# Patient Record
Sex: FEMALE | Race: WHITE | Employment: UNEMPLOYED | URBAN - METROPOLITAN AREA
[De-identification: names, ages, dates, MRNs, and addresses within clinical notes are randomized per-mention and may not be internally consistent; named-entity substitution may affect disease eponyms.]

---

## 2017-06-30 ENCOUNTER — OFFICE VISIT (OUTPATIENT)
Dept: OBGYN CLINIC | Age: 57
End: 2017-06-30

## 2017-06-30 ENCOUNTER — APPOINTMENT (OUTPATIENT)
Dept: MAMMOGRAPHY | Age: 57
End: 2017-06-30

## 2017-06-30 VITALS
HEIGHT: 64 IN | HEART RATE: 90 BPM | SYSTOLIC BLOOD PRESSURE: 127 MMHG | DIASTOLIC BLOOD PRESSURE: 82 MMHG | BODY MASS INDEX: 40.97 KG/M2 | WEIGHT: 240 LBS | RESPIRATION RATE: 19 BRPM

## 2017-06-30 DIAGNOSIS — Z12.31 ENCOUNTER FOR SCREENING MAMMOGRAM FOR MALIGNANT NEOPLASM OF BREAST: ICD-10-CM

## 2017-06-30 DIAGNOSIS — Z11.51 SPECIAL SCREENING EXAMINATION FOR HUMAN PAPILLOMAVIRUS (HPV): ICD-10-CM

## 2017-06-30 DIAGNOSIS — Z01.419 WELL FEMALE EXAM WITH ROUTINE GYNECOLOGICAL EXAM: Primary | ICD-10-CM

## 2017-06-30 RX ORDER — ROSUVASTATIN CALCIUM 20 MG/1
TABLET, COATED ORAL
COMMUNITY
Start: 2017-06-25

## 2017-06-30 RX ORDER — BUPROPION HYDROCHLORIDE 300 MG/1
TABLET ORAL
COMMUNITY
Start: 2017-06-12 | End: 2017-11-24

## 2017-06-30 RX ORDER — PANTOPRAZOLE SODIUM 40 MG/1
TABLET, DELAYED RELEASE ORAL
COMMUNITY
Start: 2017-06-08

## 2017-06-30 RX ORDER — LORATADINE 10 MG/1
TABLET ORAL
COMMUNITY
Start: 2017-06-12

## 2017-06-30 NOTE — PROGRESS NOTES
Felix Yasmanyrahul Blanton is a No obstetric history on file. ,  61 y.o. female [facility] whose LMP was on  who presents for her annual checkup. She is having no significant problems. Menstrual status:    She is not having periods because she is menopausal.    The patient is not using HRT. Contraception:    She does not need contraception because she is menopausal.    Sexual history:    She  has no sexual activity history on file. Medical conditions:    Since her last annual GYN exam about one year ago, she has had the following changes in her health history: none. Pap and Mammogram History:    Her most recent Pap smear was normal obtained 2 year(s) ago. The patient had her mammogram today in our office. Breast Cancer History/Substance Abuse:    She has no family history of breast cancer. Osteoporosis History:    Family history does not include a first or second degree relative with osteopenia or osteoporosis. A bone density scan has not been previously obtained. Past Medical History:   Diagnosis Date    Acid reflux     Depression     bi-polar    Diabetes (HCC)     High cholesterol     Hypertension     IBS (irritable bowel syndrome)     Sleep apnea      Past Surgical History:   Procedure Laterality Date    HX  SECTION      c section x2     Current Outpatient Prescriptions   Medication Sig Dispense Refill    rosuvastatin (CRESTOR) 20 mg tablet       loratadine (CLARITIN) 10 mg tablet       buPROPion XL (WELLBUTRIN XL) 300 mg XL tablet       pantoprazole (PROTONIX) 40 mg tablet       lisinopril (PRINIVIL, ZESTRIL) 20 mg tablet Take  by mouth daily.  DULCOLAX, BISACODYL, PO Take  by mouth.  citalopram (CELEXA) 40 mg tablet Take 40 mg by mouth daily.  simvastatin (ZOCOR) 40 mg tablet Take  by mouth nightly.  lamotrigine (LAMICTAL) 200 mg tablet Take  by mouth daily.  quetiapine (SEROQUEL) 50 mg tablet Take 50 mg by mouth two (2) times a day.       divalproex ER (DEPAKOTE ER) 500 mg ER tablet Take  by mouth.  hydrochlorothiazide (HYDRODIURIL) 25 mg tablet Take 25 mg by mouth daily.  fenofibrate (TRICOR) 160 mg tablet Take 160 mg by mouth daily.  aspirin delayed-release 81 mg tablet Take  by mouth daily.  glyBURIDE-metFORMIN (GLUCOVANCE) 5-500 mg per tablet Take 1 tablet by mouth Daily (before breakfast).  dicyclomine (BENTYL) 10 mg capsule Take 10 mg by mouth 4 times daily (before meals and nightly).  omega-3 fatty acids-vitamin e (FISH OIL) 1,000 mg cap Take 1 capsule by mouth.  norethindrone acetate-ethinyl estradiol (FEMHRT) 1-5 mg-mcg tab Take 1 Tab by mouth daily. 30 Tab 12    estrogen, conjugated,-medroxyPROGESTERone (PREMPRO) 0.45-1.5 mg per tablet Take 1 Tab by mouth daily. 28 Tab 12    CINNAMON BARK (CINNAMON PO) Take  by mouth.  multivitamin (ONE A DAY) tablet Take 1 tablet by mouth daily. Allergies: Review of patient's allergies indicates no known allergies. Social History     Social History    Marital status:      Spouse name: N/A    Number of children: N/A    Years of education: N/A     Occupational History    Not on file. Social History Main Topics    Smoking status: Never Smoker    Smokeless tobacco: Never Used    Alcohol use No    Drug use: No    Sexual activity: Not on file     Other Topics Concern    Not on file     Social History Narrative     Tobacco History:  reports that she has never smoked. She has never used smokeless tobacco.  Alcohol Abuse:  reports that she does not drink alcohol. Drug Abuse:  reports that she does not use illicit drugs.   Patient Active Problem List   Diagnosis Code    Costochondritis M94.0         Review of Systems - History obtained from the patient  Constitutional: negative for weight loss, fever, night sweats  HEENT: negative for hearing loss, earache, congestion, snoring, sorethroat  CV: negative for chest pain, palpitations, edema  Resp: negative for cough, shortness of breath, wheezing  GI: negative for change in bowel habits, abdominal pain, black or bloody stools  : negative for frequency, dysuria, hematuria, vaginal discharge  MSK: negative for back pain, joint pain, muscle pain  Breast: negative for breast lumps, nipple discharge, galactorrhea  Skin :negative for itching, rash, hives  Neuro: negative for dizziness, headache, confusion, weakness  Psych: negative for anxiety, depression, change in mood  Heme/lymph: negative for bleeding, bruising, pallor    Physical Exam    Visit Vitals    /82 (BP 1 Location: Left arm, BP Patient Position: Sitting)    Pulse 90    Resp 19    Ht 5' 4\" (1.626 m)    Wt 240 lb (108.9 kg)    BMI 41.2 kg/m2     Constitutional  · Appearance: well-nourished, well developed, alert, in no acute distress    HENT  · Head and Face: appears normal    Neck  · Inspection/Palpation: normal appearance, no masses or tenderness  · Lymph Nodes: no lymphadenopathy present    Chest  · Respiratory Effort: breathing normal    Breasts  · Inspection of Breasts: breasts symmetrical, no skin changes, no discharge present, nipple appearance normal, no skin retraction present  · Palpation of Breasts and Axillae: no masses present on palpation, no breast tenderness  · Axillary Lymph Nodes: no lymphadenopathy present    Gastrointestinal  · Abdominal Examination: abdomen non-tender to palpation, normal bowel sounds, no masses present  · Liver and spleen: no hepatomegaly present, spleen not palpable  · Hernias: no hernias identified    Skin  · General Inspection: no rash, no lesions identified    Neurologic/Psychiatric  · Mental Status:  · Orientation: grossly oriented to person, place and time  · Mood and Affect: mood normal, affect appropriate    Genitourinary  · External Genitalia: normal appearance for age, no discharge present, no tenderness present, no inflammatory lesions present, no masses present, no atrophy present  · Vagina: normal vaginal vault without central or paravaginal defects, no discharge present, no inflammatory lesions present, no masses present  · Bladder: non-tender to palpation  · Urethra: appears normal  · Cervix: normal   · Uterus: normal size, shape and consistency  · Adnexa: no adnexal tenderness present, no adnexal masses present  · Perineum: perineum within normal limits, no evidence of trauma, no rashes or skin lesions present  · Anus: anus within normal limits, no hemorrhoids present  · Inguinal Lymph Nodes: no lymphadenopathy present    Assessment:  Routine gynecologic examination  Her current medical status is satisfactory with no evidence of significant gynecologic issues.     Plan:  Counseled re: diet, exercise, healthy lifestyle  Return for yearly wellness visits  Rec annual mammogram  Patient Verbalized understanding

## 2017-07-04 LAB
CYTOLOGIST CVX/VAG CYTO: NORMAL
CYTOLOGY CVX/VAG DOC THIN PREP: NORMAL
CYTOLOGY HISTORY:: NORMAL
DX ICD CODE: NORMAL
HPV I/H RISK 1 DNA CVX QL PROBE+SIG AMP: NEGATIVE
Lab: NORMAL
OTHER STN SPEC: NORMAL
PATH REPORT.FINAL DX SPEC: NORMAL
STAT OF ADQ CVX/VAG CYTO-IMP: NORMAL

## 2017-09-15 ENCOUNTER — TELEPHONE (OUTPATIENT)
Dept: OBGYN CLINIC | Age: 57
End: 2017-09-15

## 2017-09-15 ENCOUNTER — OFFICE VISIT (OUTPATIENT)
Dept: OBGYN CLINIC | Age: 57
End: 2017-09-15

## 2017-09-15 DIAGNOSIS — N89.8 VAGINAL IRRITATION: Primary | ICD-10-CM

## 2017-09-15 NOTE — PROGRESS NOTES
Vaginitis evaluation    Chief Complaint   Discomfort and irritation in the vagina    HPI  61 y.o. female complains of vaginal irritation for several days. No LMP recorded. Patient is postmenopausal.  She reports additional symptoms at this time. Feels like something is in vagina. The patient denies aggravating factors. She is not concerned about possible STI exposure at this time. She denies exposure to new chemicals ot hygenic agents  Previous treatment included: none    Past Medical History:   Diagnosis Date    Acid reflux     Depression     bi-polar    Diabetes ([address]     High cholesterol     Hypertension     IBS (irritable bowel syndrome)     Sleep apnea      Past Surgical History:   Procedure Laterality Date    HX  SECTION      c section x2     Social History     Occupational History    Not on file. Social History Main Topics    Smoking status: Never Smoker    Smokeless tobacco: Never Used    Alcohol use No    Drug use: No    Sexual activity: Not on file     Family History   Problem Relation Age of Onset    Psychiatric Disorder Mother     Cancer Mother      brain    Cancer Father      prostate    Heart Disease Father         No Known Allergies  Prior to Admission medications    Medication Sig Start Date End Date Taking? Authorizing Provider   rosuvastatin (CRESTOR) 20 mg tablet  17  Yes Historical Provider   loratadine (CLARITIN) 10 mg tablet  17  Yes Historical Provider   buPROPion XL (WELLBUTRIN XL) 300 mg XL tablet  17  Yes Historical Provider   pantoprazole (PROTONIX) 40 mg tablet  17  Yes Historical Provider   lisinopril (PRINIVIL, ZESTRIL) 20 mg tablet Take  by mouth daily. Yes Historical Provider   DULCOLAX, BISACODYL, PO Take  by mouth. Yes Historical Provider   citalopram (CELEXA) 40 mg tablet Take 40 mg by mouth daily. Yes Historical Provider   lamotrigine (LAMICTAL) 200 mg tablet Take  by mouth daily.    Yes Historical Provider   divalproex ER (DEPAKOTE ER) 500 mg ER tablet Take  by mouth. Yes Historical Provider   hydrochlorothiazide (HYDRODIURIL) 25 mg tablet Take 25 mg by mouth daily. Yes Historical Provider   fenofibrate (TRICOR) 160 mg tablet Take 160 mg by mouth daily. Yes Historical Provider   glyBURIDE-metFORMIN (GLUCOVANCE) 5-500 mg per tablet Take 1 tablet by mouth Daily (before breakfast). Yes Historical Provider   dicyclomine (BENTYL) 10 mg capsule Take 10 mg by mouth 4 times daily (before meals and nightly). Yes Historical Provider   simvastatin (ZOCOR) 40 mg tablet Take  by mouth nightly. Historical Provider   quetiapine (SEROQUEL) 50 mg tablet Take 50 mg by mouth two (2) times a day. Historical Provider   aspirin delayed-release 81 mg tablet Take  by mouth daily. Historical Provider   omega-3 fatty acids-vitamin e (FISH OIL) 1,000 mg cap Take 1 capsule by mouth. Historical Provider                      Review of Systems - History obtained from the patient  Constitutional: negative for weight loss, fever, night sweats  Breast: negative for breast lumps, nipple discharge, galactorrhea  GI: negative for change in bowel habits, abdominal pain, black or bloody stools  : negative for frequency, dysuria, hematuria  MSK: negative for back pain, joint pain, muscle pain  Skin: negative for itching, rash, hives  Neuro: negative for dizziness, headache, confusion, weakness  Psych: negative for anxiety, depression, change in mood  Heme/lymph: negative for bleeding, bruising, pallor       Objective: There were no vitals taken for this visit.     Physical Exam:   PHYSICAL EXAMINATION    Constitutional  · Appearance: well-nourished, well developed, alert, in no acute distress    HENT  · Head and Face: appears normal    Genitourinary  · External Genitalia: normal appearance for age, no discharge present, no tenderness present, no inflammatory lesions present, no masses present, no atrophy present  · Vagina:  no discharge present, otherwise normal vaginal vault without central or paravaginal defects, no inflammatory lesions present, no masses present, atrophic changes present  · Bladder: non-tender to palpation  · Urethra: appears normal  · Cervix: normal   · Uterus: normal size, shape and consistency  · Adnexa: no adnexal tenderness present, no adnexal masses present  · Perineum: perineum within normal limits, no evidence of trauma, no rashes or skin lesions present  · Anus: anus within normal limits, no hemorrhoids present  · Inguinal Lymph Nodes: no lymphadenopathy present    Skin  · General Inspection: no rash, no lesions identified    Neurologic/Psychiatric  · Mental Status:  · Orientation: grossly oriented to person, place and time  · Mood and Affect: mood normal, affect appropriate          Assessment:   Symptoms ? From atrophic vaginitis    Plan:   Treatment: Try vaginal moisturizers. If symptoms persist then RTO for an US. ROV prn if symptoms persist or worsen.

## 2017-09-15 NOTE — TELEPHONE ENCOUNTER
61year old patient calling c/o feeling like something is stuck inside of her vagina. Patient denies vaginal discharge, bleeding, itching and irritation. Per patient she is very uncomfortable. Patient requesting to be seen today if possible.  Patient added to schedule to see [facility] [address] today at 3:40pm.

## 2017-11-24 ENCOUNTER — OFFICE VISIT (OUTPATIENT)
Dept: OBGYN CLINIC | Age: 57
End: 2017-11-24

## 2017-11-24 ENCOUNTER — TELEPHONE (OUTPATIENT)
Dept: OBGYN CLINIC | Age: 57
End: 2017-11-24

## 2017-11-24 VITALS — BODY MASS INDEX: 40.97 KG/M2 | RESPIRATION RATE: 19 BRPM | HEIGHT: 64 IN | WEIGHT: 240 LBS

## 2017-11-24 DIAGNOSIS — N63.10 BREAST MASS, RIGHT: Primary | ICD-10-CM

## 2017-11-24 RX ORDER — LANOLIN ALCOHOL/MO/W.PET/CERES
CREAM (GRAM) TOPICAL
COMMUNITY
Start: 2017-11-01

## 2017-11-24 RX ORDER — SIMVASTATIN 40 MG/1
TABLET, FILM COATED ORAL
COMMUNITY
End: 2017-11-27

## 2017-11-24 RX ORDER — GUAIFENESIN 100 MG/5ML
81 LIQUID (ML) ORAL DAILY
COMMUNITY

## 2017-11-24 NOTE — TELEPHONE ENCOUNTER
Patient is calling with concerns of right breast nipple pain. Started recently with sensitivity to the touch, hot to the touch, no discharge from nipple. Had a mammogram 3 months ago per patient that was normal.  Patient states that visibly the nipple looks different to her and looks slightly inverted. Please advise, do you want to see this patient today (concerned about mastitis/cellulitis with being hot to the touch? Or schedule for your next available?

## 2017-11-24 NOTE — PROGRESS NOTES
appearance normal, no skin retraction present; 1 cm tender mass under right nipple  · Palpation of Breasts and Axillae: no masses present on palpation except under right nipple, right  Breast nipple tenderness  · Axillary Lymph Nodes: no lymphadenopathy present    Skin  · General Inspection: no rash, no lesions identified    Neurologic/Psychiatric  · Mental Status:  · Orientation: grossly oriented to person, place and time  · Mood and Affect: mood normal, affect appropriate    Assessment:  Nipple mass, no evidence of mastitis    Plan:  Refer to [facility]

## 2017-11-27 ENCOUNTER — OFFICE VISIT (OUTPATIENT)
Dept: SURGERY | Age: 57
End: 2017-11-27

## 2017-11-27 VITALS
BODY MASS INDEX: 41.06 KG/M2 | WEIGHT: 240.5 LBS | HEIGHT: 64 IN | DIASTOLIC BLOOD PRESSURE: 78 MMHG | SYSTOLIC BLOOD PRESSURE: 140 MMHG | HEART RATE: 98 BPM

## 2017-11-27 DIAGNOSIS — N63.10 BREAST MASS, RIGHT: Primary | ICD-10-CM

## 2017-11-27 PROBLEM — E66.01 OBESITY, MORBID (HCC): Status: ACTIVE | Noted: 2017-11-27

## 2017-11-27 RX ORDER — FENOFIBRATE 160 MG/1
TABLET ORAL
COMMUNITY
Start: 2017-11-24

## 2017-11-27 RX ORDER — BUPROPION HYDROCHLORIDE 300 MG/1
TABLET ORAL
COMMUNITY
Start: 2017-11-21

## 2017-11-27 RX ORDER — HYDROCHLOROTHIAZIDE 25 MG/1
TABLET ORAL
COMMUNITY
Start: 2017-11-07

## 2017-11-27 NOTE — PROGRESS NOTES
HISTORY OF PRESENT ILLNESS  Vee Blanton is a 61 y.o. female. HPI NEW patient referral for consultation by Dr. Shelley Tran for a palpable lump under her RIGHT nipple. The patient states she found it 5 days ago and it is tender to palpation. The pain level is a #6 on pain scale. The RIGHT nipple is inverted but denies any nipple discharge. The nipple is not reddened and is not warm to touch. Obstetric History     No data available      Obstetric Comments   Menarche: 14. LMP: age 35. # of Children: 2. Age at Delivery of First Child: 23.   Hysterectomy/oopho no/no. Breast Bx:  Yes LEFT 2008. Hx of Breast Feeding:  yes. BCP: yes. Hormone therapy: yes very short period of time. .       No family history of breast or ovarian cancer. Mother brain cancer at age 61. Father - prostate cancer at age 58. Mammogram screening 6/2017 - BI RADS 1  Review of Systems   Constitutional: Negative. HENT: Negative. Eyes: Negative. Respiratory: Negative. Cardiovascular: Negative. Gastrointestinal: Negative. Genitourinary: Negative. Musculoskeletal: Negative. Skin: Negative. Neurological: Negative. Endo/Heme/Allergies: Negative. Psychiatric/Behavioral: Positive for depression. The patient is nervous/anxious and has insomnia.         Physical Exam    ASSESSMENT and PLAN  {ASSESSMENT/PLAN:23907}

## 2017-11-27 NOTE — PROGRESS NOTES
HISTORY OF PRESENT ILLNESS  Vee Blanton is a 61 y.o. female. HPI  NEW patient referral for consultation by Dr. Tavares Castellon for a palpable lump under her RIGHT nipple. The patient states she found it 5 days ago and it is tender to palpation. The pain level is a #6 on pain scale. The RIGHT nipple is inverted but denies any nipple discharge. The nipple is not reddened and is not warm to touch. Obstetric History     No data available       Obstetric Comments   Menarche: 14. LMP: age 35. # of Children: 2. Age at Delivery of First Child: 23.   Hysterectomy/oopho no/no. Breast Bx:  Yes LEFT . Hx of Breast Feeding:  yes. BCP: yes. Hormone therapy: yes very short period of time. .       No family history of breast or ovarian cancer. Mother brain cancer at age 61. Father - prostate cancer at age 58. Mammogram screening 2017 - BI RADS 1    Past Medical History:   Diagnosis Date    Acid reflux     Depression     bi-polar    Diabetes (HCC)     High cholesterol     Hypertension     IBS (irritable bowel syndrome)     Sleep apnea        Past Surgical History:   Procedure Laterality Date    HX  SECTION      c section x2       Social History     Social History    Marital status:      Spouse name: N/A    Number of children: N/A    Years of education: N/A     Occupational History    Not on file. Social History Main Topics    Smoking status: Never Smoker    Smokeless tobacco: Never Used    Alcohol use No    Drug use: No    Sexual activity: Not on file     Other Topics Concern    Not on file     Social History Narrative       Current Outpatient Prescriptions on File Prior to Visit   Medication Sig Dispense Refill    ferrous sulfate 325 mg (65 mg iron) tablet       DOC-Q-LACE 100 mg capsule       aspirin 81 mg chewable tablet Take 81 mg by mouth daily.       rosuvastatin (CRESTOR) 20 mg tablet       loratadine (CLARITIN) 10 mg tablet       pantoprazole (PROTONIX) 40 mg tablet  lisinopril (PRINIVIL, ZESTRIL) 20 mg tablet Take  by mouth daily.  DULCOLAX, BISACODYL, PO Take  by mouth.  citalopram (CELEXA) 40 mg tablet Take 40 mg by mouth daily.  lamotrigine (LAMICTAL) 200 mg tablet Take  by mouth daily.  divalproex ER (DEPAKOTE ER) 500 mg ER tablet Take  by mouth.  glyBURIDE-metFORMIN (GLUCOVANCE) 5-500 mg per tablet Take 1 tablet by mouth Daily (before breakfast).  dicyclomine (BENTYL) 10 mg capsule Take 10 mg by mouth 4 times daily (before meals and nightly). No current facility-administered medications on file prior to visit. No Known Allergies    OB History     Obstetric Comments    Menarche: 14. LMP: age 35. # of Children: 2. Age at Delivery of First Child: 23.   Hysterectomy/oopho no/no. Breast Bx:  Yes LEFT 2008. Hx of Breast Feeding:  yes. BCP: yes. Hormone therapy: yes very short period of time. .           ROS  Constitutional: Negative. HENT: Negative. Eyes: Negative. Respiratory: Negative. Cardiovascular: Negative. Gastrointestinal: Negative. Genitourinary: Negative. Musculoskeletal: Negative. Skin: Negative. Neurological: Negative. Endo/Heme/Allergies: Negative. Psychiatric/Behavioral: Positive for depression. The patient is nervous/anxious and has insomnia. Physical Exam   Cardiovascular: Normal rate and normal heart sounds. Pulmonary/Chest: Breath sounds normal. Right breast exhibits mass and tenderness. Right breast exhibits no inverted nipple, no nipple discharge and no skin change. Left breast exhibits no inverted nipple, no mass, no nipple discharge, no skin change and no tenderness. Breasts are symmetrical.       Lymphadenopathy:        Right cervical: No superficial cervical, no deep cervical and no posterior cervical adenopathy present. Left cervical: No superficial cervical, no deep cervical and no posterior cervical adenopathy present.         Right axillary: No pectoral and no lateral adenopathy present. Left axillary: No pectoral and no lateral adenopathy present. BREAST ULTRASOUND  Indication: Right  breast mass retroareola  Technique: The area was scanned using a high-frequency linear-array near-field transducer  Findings: No abnormal mass, lesion, or shadowing noted. No cysts  Impression: Normal breast tissue   Disposition: No worrisome finding on ultrasound  ASSESSMENT and PLAN    ICD-10-CM ICD-9-CM    1. Breast mass, right N63.10 611.72      Pt presents with RIGHT retroareolar mass. Normal exam and US today. Pt states she had [facility] screening mammo in October, which was also normal. Advised to observe and f/u in 3-4 months for reexamination if any change. This plan was reviewed with the patient and patient agrees. All questions were answered.     Written by John Paul Bradley, as dictated by Dr. Jude Heller MD.

## 2017-11-27 NOTE — PATIENT INSTRUCTIONS
Breast Lumps: Care Instructions  Your Care Instructions  Breast lumps are common, especially in women between ages 27 and 48. Many women's breasts feel lumpy and tender before their menstrual period. Women also may have lumps when they are breastfeeding. Breast lumps may go away after menopause. All new breast lumps in women after menopause should be checked by a doctor. Although lumps may be normal for you, it is important to have your doctor check any lump or thickness that is not like the rest of your breast to make sure it is not cancer. A lump may be larger, harder, or different from the rest of your breast tissue. Follow-up care is a key part of your treatment and safety. Be sure to make and go to all appointments, and call your doctor if you are having problems. It's also a good idea to know your test results and keep a list of the medicines you take. How can you care for yourself at home? · Make an appointment to have a mammogram and other follow-up visits as recommended by your doctor. When should you call for help? Watch closely for changes in your health, and be sure to contact your doctor if:  · You do not get better as expected. · Your breast has changed. · You have pain in your breast.  · You have a discharge from your nipple. · A breast lump changes or does not go away. Where can you learn more? Go to http://mitch-guillermina.info/. Enter R940 in the search box to learn more about \"Breast Lumps: Care Instructions. \"  Current as of: October 13, 2016  Content Version: 11.4  © 6880-6759 [employer]. Care instructions adapted under license by [employer] (which disclaims liability or warranty for this information). If you have questions about a medical condition or this instruction, always ask your healthcare professional. [address] any warranty or liability for your use of this information.

## 2017-11-27 NOTE — LETTER
2017 4:35 PM 
 
Patient:  Richard Tay YOB: 1960 Date of Visit: 2017 Dear Dr. Ashley Wong: Thank you for referring Ms. Vee Blanton to me for evaluation/treatment. Below are the relevant portions of my assessment and plan of care. HISTORY OF PRESENT ILLNESS Richard Tay is a 61 y.o. female. HPI 
NEW patient referral for consultation by Dr. Ashley Wong for a palpable lump under her RIGHT nipple. The patient states she found it 5 days ago and it is tender to palpation. The pain level is a #6 on pain scale. The RIGHT nipple is inverted but denies any nipple discharge. The nipple is not reddened and is not warm to touch. Obstetric History No data available  
   
Obstetric Comments Menarche: 14. LMP: age 35. # of Children: 2. Age at Delivery of First Child: 23.   Hysterectomy/oopho no/no. Breast Bx:  Yes LEFT . Hx of Breast Feeding:  yes. BCP: yes. Hormone therapy: yes very short period of time. Mickey Stone No family history of breast or ovarian cancer. Mother brain cancer at age 61. Father - prostate cancer at age 58. Mammogram screening 2017 - BI RADS 1 Past Medical History:  
Diagnosis Date  Acid reflux  Depression   
 bi-polar  Diabetes ([address]  High cholesterol  Hypertension  IBS (irritable bowel syndrome)  Sleep apnea Past Surgical History:  
Procedure Laterality Date  HX  SECTION    
 c section x2 Social History Social History  Marital status:  Spouse name: N/A  
 Number of children: N/A  
 Years of education: N/A Occupational History  Not on file. Social History Main Topics  Smoking status: Never Smoker  Smokeless tobacco: Never Used  Alcohol use No  
 Drug use: No  
 Sexual activity: Not on file Other Topics Concern  Not on file Social History Narrative Current Outpatient Prescriptions on File Prior to Visit Medication Sig Dispense Refill  ferrous sulfate 325 mg (65 mg iron) tablet  DOC-Q-LACE 100 mg capsule  aspirin 81 mg chewable tablet Take 81 mg by mouth daily.  rosuvastatin (CRESTOR) 20 mg tablet  loratadine (CLARITIN) 10 mg tablet  pantoprazole (PROTONIX) 40 mg tablet  lisinopril (PRINIVIL, ZESTRIL) 20 mg tablet Take  by mouth daily.  DULCOLAX, BISACODYL, PO Take  by mouth.  citalopram (CELEXA) 40 mg tablet Take 40 mg by mouth daily.  lamotrigine (LAMICTAL) 200 mg tablet Take  by mouth daily.  divalproex ER (DEPAKOTE ER) 500 mg ER tablet Take  by mouth.  glyBURIDE-metFORMIN (GLUCOVANCE) 5-500 mg per tablet Take 1 tablet by mouth Daily (before breakfast).  dicyclomine (BENTYL) 10 mg capsule Take 10 mg by mouth 4 times daily (before meals and nightly). No current facility-administered medications on file prior to visit. No Known Allergies OB History Obstetric Comments Menarche: 14. LMP: age 35. # of Children: 2. Age at Delivery of First Child: 23.   Hysterectomy/oopho no/no. Breast Bx:  Yes LEFT 2008. Hx of Breast Feeding:  yes. BCP: yes. Hormone therapy: yes very short period of time. Tomy LUO Constitutional: Negative. HENT: Negative. Eyes: Negative. Respiratory: Negative. Cardiovascular: Negative. Gastrointestinal: Negative. Genitourinary: Negative. Musculoskeletal: Negative. Skin: Negative. Neurological: Negative. Endo/Heme/Allergies: Negative. Psychiatric/Behavioral: Positive for depression. The patient is nervous/anxious and has insomnia. Physical Exam  
Cardiovascular: Normal rate and normal heart sounds. Pulmonary/Chest: Breath sounds normal. Right breast exhibits mass and tenderness. Right breast exhibits no inverted nipple, no nipple discharge and no skin change. Left breast exhibits no inverted nipple, no mass, no nipple discharge, no skin change and no tenderness.  Breasts are symmetrical.  
 
 
 Lymphadenopathy:  
     Right cervical: No superficial cervical, no deep cervical and no posterior cervical adenopathy present. Left cervical: No superficial cervical, no deep cervical and no posterior cervical adenopathy present. Right axillary: No pectoral and no lateral adenopathy present. Left axillary: No pectoral and no lateral adenopathy present. BREAST ULTRASOUND Indication: Right  breast mass retroareola Technique: The area was scanned using a high-frequency linear-array near-field transducer Findings: No abnormal mass, lesion, or shadowing noted. No cysts Impression: Normal breast tissue Disposition: No worrisome finding on ultrasound ASSESSMENT and PLAN 
  ICD-10-CM ICD-9-CM 1. Breast mass, right N63.10 611.72 Pt presents with RIGHT retroareolar mass. Normal exam and US today. Pt states she had BL screening mammo in October, which was also normal. Advised to observe and f/u in 3-4 months for reexamination if any change. This plan was reviewed with the patient and patient agrees. All questions were answered. Written by [facility], as dictated by Dr. Hossein Moon MD.  
 
 
 
If you have questions, please do not hesitate to call me. I look forward to following Ms. Blanton along with you.  
 
 
 
Sincerely, 
 
 
Anabela Torres MD

## 2017-11-27 NOTE — COMMUNICATION BODY
HISTORY OF PRESENT ILLNESS  Vee Blanton is a 61 y.o. female. HPI  NEW patient referral for consultation by Dr. Cierra Hanna for a palpable lump under her RIGHT nipple. The patient states she found it 5 days ago and it is tender to palpation. The pain level is a #6 on pain scale. The RIGHT nipple is inverted but denies any nipple discharge. The nipple is not reddened and is not warm to touch. Obstetric History     No data available       Obstetric Comments   Menarche: 14. LMP: age 35. # of Children: 2. Age at Delivery of First Child: 23.   Hysterectomy/oopho no/no. Breast Bx:  Yes LEFT . Hx of Breast Feeding:  yes. BCP: yes. Hormone therapy: yes very short period of time. .       No family history of breast or ovarian cancer. Mother brain cancer at age 61. Father - prostate cancer at age 58. Mammogram screening 2017 - BI RADS 1    Past Medical History:   Diagnosis Date    Acid reflux     Depression     bi-polar    Diabetes (HCC)     High cholesterol     Hypertension     IBS (irritable bowel syndrome)     Sleep apnea        Past Surgical History:   Procedure Laterality Date    HX  SECTION      c section x2       Social History     Social History    Marital status:      Spouse name: N/A    Number of children: N/A    Years of education: N/A     Occupational History    Not on file. Social History Main Topics    Smoking status: Never Smoker    Smokeless tobacco: Never Used    Alcohol use No    Drug use: No    Sexual activity: Not on file     Other Topics Concern    Not on file     Social History Narrative       Current Outpatient Prescriptions on File Prior to Visit   Medication Sig Dispense Refill    ferrous sulfate 325 mg (65 mg iron) tablet       DOC-Q-LACE 100 mg capsule       aspirin 81 mg chewable tablet Take 81 mg by mouth daily.       rosuvastatin (CRESTOR) 20 mg tablet       loratadine (CLARITIN) 10 mg tablet       pantoprazole (PROTONIX) 40 mg tablet  lisinopril (PRINIVIL, ZESTRIL) 20 mg tablet Take  by mouth daily.  DULCOLAX, BISACODYL, PO Take  by mouth.  citalopram (CELEXA) 40 mg tablet Take 40 mg by mouth daily.  lamotrigine (LAMICTAL) 200 mg tablet Take  by mouth daily.  divalproex ER (DEPAKOTE ER) 500 mg ER tablet Take  by mouth.  glyBURIDE-metFORMIN (GLUCOVANCE) 5-500 mg per tablet Take 1 tablet by mouth Daily (before breakfast).  dicyclomine (BENTYL) 10 mg capsule Take 10 mg by mouth 4 times daily (before meals and nightly). No current facility-administered medications on file prior to visit. No Known Allergies    OB History     Obstetric Comments    Menarche: 14. LMP: age 35. # of Children: 2. Age at Delivery of First Child: 23.   Hysterectomy/oopho no/no. Breast Bx:  Yes LEFT 2008. Hx of Breast Feeding:  yes. BCP: yes. Hormone therapy: yes very short period of time. .           ROS  Constitutional: Negative. HENT: Negative. Eyes: Negative. Respiratory: Negative. Cardiovascular: Negative. Gastrointestinal: Negative. Genitourinary: Negative. Musculoskeletal: Negative. Skin: Negative. Neurological: Negative. Endo/Heme/Allergies: Negative. Psychiatric/Behavioral: Positive for depression. The patient is nervous/anxious and has insomnia. Physical Exam   Cardiovascular: Normal rate and normal heart sounds. Pulmonary/Chest: Breath sounds normal. Right breast exhibits mass and tenderness. Right breast exhibits no inverted nipple, no nipple discharge and no skin change. Left breast exhibits no inverted nipple, no mass, no nipple discharge, no skin change and no tenderness. Breasts are symmetrical.       Lymphadenopathy:        Right cervical: No superficial cervical, no deep cervical and no posterior cervical adenopathy present. Left cervical: No superficial cervical, no deep cervical and no posterior cervical adenopathy present.         Right axillary: No pectoral and no lateral adenopathy present. Left axillary: No pectoral and no lateral adenopathy present. BREAST ULTRASOUND  Indication: Right  breast mass retroareola  Technique: The area was scanned using a high-frequency linear-array near-field transducer  Findings: No abnormal mass, lesion, or shadowing noted. No cysts  Impression: Normal breast tissue   Disposition: No worrisome finding on ultrasound  ASSESSMENT and PLAN    ICD-10-CM ICD-9-CM    1. Breast mass, right N63.10 611.72      Pt presents with RIGHT retroareolar mass. Normal exam and US today. Pt states she had BL screening mammo in October, which was also normal. Advised to observe and f/u in 3-4 months for reexamination if any change. This plan was reviewed with the patient and patient agrees. All questions were answered.     Written by Michele Vaughn, as dictated by Dr. Gagan Simpson MD.

## 2018-02-09 ENCOUNTER — OFFICE VISIT (OUTPATIENT)
Dept: OBGYN CLINIC | Age: 58
End: 2018-02-09

## 2018-02-09 VITALS — WEIGHT: 232 LBS | BODY MASS INDEX: 39.61 KG/M2 | HEIGHT: 64 IN | RESPIRATION RATE: 19 BRPM

## 2018-02-09 DIAGNOSIS — N63.20 LEFT BREAST MASS: Primary | ICD-10-CM

## 2018-02-09 NOTE — PROGRESS NOTES
Breast Lump Evaluation    Vee Blanton is a No obstetric history on file. ,  61 y.o. female  whose No LMP recorded. Patient is postmenopausal..    She presents with breast mass located in the left breast at 10 o'clock. She noticed it 1 day ago. The lump is not tender and has not changed in size. The patient has not noticed changes in the area of the lump: No dimpling, nipple retraction or discharge. No masses or nodes. .    The patient notes the following associated symptoms: none  She notes that the following factors improve her symptoms: none                                                                                                                             She notes that the following factors aggravate her symptoms:none                                                                                                                               She has had any diagnostic studies for this problem since she noticed it. In regards to breast problems her medical history is significant for the following: none                                                                                                There is not a family history of breast cancer in a first and second degree relative.     Objective:    Visit Vitals    Resp 19    Ht 5' 4\" (1.626 m)    Wt 240 lb (108.9 kg)    BMI 41.2 kg/m2       Physical Exam:    Constitutional  · Appearance: well-nourished, well developed, alert, in no acute distress    HENT  · Head and Face: appears normal    Neck  · Inspection/Palpation: normal appearance, no masses or tenderness  · Lymph Nodes: no lymphadenopathy present  · Thyroid: gland size normal, nontender, no nodules or masses present on palpation    Breasts  · Inspection of Breasts: breasts symmetrical, no skin changes, no discharge present, nipple appearance normal, no skin retraction present  · Palpation of Breasts and Axillae: 1-2 cm firm slightly tender mass on right peripheral breast at 11 o'clock  · Axillary Lymph Nodes: no lymphadenopathy present    Skin  · General Inspection: no rash, no lesions identified    Neurologic/Psychiatric  · Mental Status:  · Orientation: grossly oriented to person, place and time  · Mood and Affect: mood normal, affect appropriate    Assessment:  Left breast mass, ?  Lymph node    Plan:  She will see [address] [place] [address] if mass dose not resolve in next few weeks

## 2018-02-09 NOTE — PATIENT INSTRUCTIONS
Breast Lumps: Care Instructions  Your Care Instructions  Breast lumps are common, especially in women between ages 27 and 48. Many women's breasts feel lumpy and tender before their menstrual period. Women also may have lumps when they are breastfeeding. Breast lumps may go away after menopause. All new breast lumps in women after menopause should be checked by a doctor. Although lumps may be normal for you, it is important to have your doctor check any lump or thickness that is not like the rest of your breast to make sure it is not cancer. A lump may be larger, harder, or different from the rest of your breast tissue. Follow-up care is a key part of your treatment and safety. Be sure to make and go to all appointments, and call your doctor if you are having problems. It's also a good idea to know your test results and keep a list of the medicines you take. How can you care for yourself at home? · Make an appointment to have a mammogram and other follow-up visits as recommended by your doctor. When should you call for help? Watch closely for changes in your health, and be sure to contact your doctor if:  · You do not get better as expected. · Your breast has changed. · You have pain in your breast.  · You have a discharge from your nipple. · A breast lump changes or does not go away. Where can you learn more? Go to http://mitch-guillermina.info/. Enter O531 in the search box to learn more about \"Breast Lumps: Care Instructions. \"  Current as of: October 13, 2016  Content Version: 11.4  © 5260-0774 [employer]. Care instructions adapted under license by [employer] (which disclaims liability or warranty for this information). If you have questions about a medical condition or this instruction, always ask your healthcare professional. [address] any warranty or liability for your use of this information.

## 2018-08-16 ENCOUNTER — OFFICE VISIT (OUTPATIENT)
Dept: OBGYN CLINIC | Age: 58
End: 2018-08-16

## 2018-08-16 VITALS
SYSTOLIC BLOOD PRESSURE: 110 MMHG | WEIGHT: 232.25 LBS | DIASTOLIC BLOOD PRESSURE: 78 MMHG | BODY MASS INDEX: 39.65 KG/M2 | HEIGHT: 64 IN

## 2018-08-16 DIAGNOSIS — Z01.419 WELL WOMAN EXAM WITH ROUTINE GYNECOLOGICAL EXAM: Primary | ICD-10-CM

## 2018-08-16 RX ORDER — NAPROXEN 500 MG/1
TABLET ORAL
COMMUNITY
Start: 2018-07-11 | End: 2020-10-13

## 2018-08-16 NOTE — PROGRESS NOTES
[de-identified] M Day is a No obstetric history on file. ,  61 y.o. female [facility] whose LMP was on  who presents for her annual checkup. She is having no significant problems. Menstrual status:    She is not having periods because she is menopausal.    The patient is not using HRT. Contraception:    She does not need contraception because she is menopausal.    Sexual history:    She  has no sexual activity history on file. Medical conditions:    Since her last annual GYN exam about one year ago, she has had the following changes in her health history: none. Pap and Mammogram History:    Her most recent Pap smear was normal obtained 1 year(s) ago. The patient had her mammogram today in our office. Breast Cancer History/Substance Abuse:    She has no and a family history of breast cancer. Osteoporosis History:    Family history does not include a first or second degree relative with osteopenia or osteoporosis. A bone density scan has not been previously obtained. Past Medical History:   Diagnosis Date    Acid reflux     Depression     bi-polar    Diabetes ([address]     High cholesterol     Hypertension     IBS (irritable bowel syndrome)     Sleep apnea      Past Surgical History:   Procedure Laterality Date    HX  SECTION      c section x2     Current Outpatient Prescriptions   Medication Sig Dispense Refill    naproxen (NAPROSYN) 500 mg tablet       buPROPion XL (WELLBUTRIN XL) 300 mg XL tablet       fenofibrate (LOFIBRA) 160 mg tablet       hydroCHLOROthiazide (HYDRODIURIL) 25 mg tablet       ferrous sulfate 325 mg (65 mg iron) tablet       DOC-Q-LACE 100 mg capsule       aspirin 81 mg chewable tablet Take 81 mg by mouth daily.  rosuvastatin (CRESTOR) 20 mg tablet       loratadine (CLARITIN) 10 mg tablet       pantoprazole (PROTONIX) 40 mg tablet       lisinopril (PRINIVIL, ZESTRIL) 20 mg tablet Take  by mouth daily.       DULCOLAX, BISACODYL, PO Take by mouth.  citalopram (CELEXA) 40 mg tablet Take 40 mg by mouth daily.  lamotrigine (LAMICTAL) 200 mg tablet Take  by mouth daily.  divalproex ER (DEPAKOTE ER) 500 mg ER tablet Take  by mouth.  glyBURIDE-metFORMIN (GLUCOVANCE) 5-500 mg per tablet Take 1 tablet by mouth Daily (before breakfast).  dicyclomine (BENTYL) 10 mg capsule Take 10 mg by mouth 4 times daily (before meals and nightly). Allergies: Review of patient's allergies indicates no known allergies. Social History     Social History    Marital status:      Spouse name: N/A    Number of children: N/A    Years of education: N/A     Occupational History    Not on file. Social History Main Topics    Smoking status: Never Smoker    Smokeless tobacco: Never Used    Alcohol use No    Drug use: No    Sexual activity: Not on file     Other Topics Concern    Not on file     Social History Narrative     Tobacco History:  reports that she has never smoked. She has never used smokeless tobacco.  Alcohol Abuse:  reports that she does not drink alcohol. Drug Abuse:  reports that she does not use illicit drugs.   Patient Active Problem List   Diagnosis Code    Costochondritis M94.0    Obesity, morbid ([facility] E66.01         Review of Systems - History obtained from the patient  Constitutional: negative for weight loss, fever, night sweats  HEENT: negative for hearing loss, earache, congestion, snoring, sorethroat  CV: negative for chest pain, palpitations, edema  Resp: negative for cough, shortness of breath, wheezing  GI: negative for change in bowel habits, abdominal pain, black or bloody stools  : negative for frequency, dysuria, hematuria, vaginal discharge  MSK: negative for back pain, joint pain, muscle pain  Breast: negative for breast lumps, nipple discharge, galactorrhea  Skin :negative for itching, rash, hives  Neuro: negative for dizziness, headache, confusion, weakness  Psych: negative for anxiety, depression, change in mood  Heme/lymph: negative for bleeding, bruising, pallor    Physical Exam    Visit Vitals    /78    Ht 5' 4\" (1.626 m)    Wt 232 lb 4 oz (105.3 kg)    BMI 39.87 kg/m2     Constitutional  · Appearance: well-nourished, well developed, alert, in no acute distress    HENT  · Head and Face: appears normal    Neck  · Inspection/Palpation: normal appearance, no masses or tenderness  · Lymph Nodes: no lymphadenopathy present    Chest  · Respiratory Effort: breathing normal    Breasts  · Inspection of Breasts: breasts symmetrical, no skin changes, no discharge present, nipple appearance normal, no skin retraction present  · Palpation of Breasts and Axillae: no masses present on palpation, no breast tenderness  · Axillary Lymph Nodes: no lymphadenopathy present    Gastrointestinal  · Abdominal Examination: abdomen non-tender to palpation, normal bowel sounds, no masses present  · Liver and spleen: no hepatomegaly present, spleen not palpable  · Hernias: no hernias identified    Skin  · General Inspection: no rash, no lesions identified    Neurologic/Psychiatric  · Mental Status:  · Orientation: grossly oriented to person, place and time  · Mood and Affect: mood normal, affect appropriate    Genitourinary  · External Genitalia: normal appearance for age, no discharge present, no tenderness present, no inflammatory lesions present, no masses present, no atrophy present  · Vagina: normal vaginal vault without central or paravaginal defects, no discharge present, no inflammatory lesions present, no masses present  · Bladder: non-tender to palpation  · Urethra: appears normal  · Cervix: normal   · Uterus: normal size, shape and consistency  · Adnexa: no adnexal tenderness present, no adnexal masses present  · Perineum: perineum within normal limits, no evidence of trauma, no rashes or skin lesions present  · Anus: anus within normal limits, no hemorrhoids present  · Inguinal Lymph Nodes: no lymphadenopathy present    Assessment:  Routine gynecologic examination  Her current medical status is satisfactory with no evidence of significant gynecologic issues.     Plan:  Counseled re: diet, exercise, healthy lifestyle  Return for yearly wellness visits  Rec annual mammogram  Patient Verbalized understanding

## 2019-07-15 ENCOUNTER — TELEPHONE (OUTPATIENT)
Dept: OBGYN CLINIC | Age: 59
End: 2019-07-15

## 2019-07-15 NOTE — TELEPHONE ENCOUNTER
Patient of [address] She had her last Mammo on 8/16/18 which was normal but was noted to have dense breast tissue. She founded a lump 3 days ago in her left breast and is concerned. No pain. She wants to try to be worked into the schedule for Thursday am or Friday after 2 pm and you are work in doctor both days. Would you mind looking at your schedule to see if you could work her in at those times?

## 2019-07-18 ENCOUNTER — OFFICE VISIT (OUTPATIENT)
Dept: OBGYN CLINIC | Age: 59
End: 2019-07-18

## 2019-07-18 VITALS
HEIGHT: 64 IN | WEIGHT: 215.6 LBS | DIASTOLIC BLOOD PRESSURE: 84 MMHG | SYSTOLIC BLOOD PRESSURE: 128 MMHG | BODY MASS INDEX: 36.81 KG/M2

## 2019-07-18 DIAGNOSIS — N63.20 LEFT BREAST LUMP: Primary | ICD-10-CM

## 2019-07-18 RX ORDER — PATIROMER 8.4 G/1
POWDER, FOR SUSPENSION ORAL
COMMUNITY
Start: 2019-07-15

## 2019-07-18 NOTE — PATIENT INSTRUCTIONS
Breast Lumps: Care Instructions  Your Care Instructions  Breast lumps are common, especially in women between ages 27 and 48. Many women's breasts feel lumpy and tender before their menstrual period. Women also may have lumps when they are breastfeeding. Breast lumps may go away after menopause. All new breast lumps in women after menopause should be checked by a doctor. Although lumps may be normal for you, it is important to have your doctor check any lump or thickness that is not like the rest of your breast to make sure it is not cancer. A lump may be larger, harder, or different from the rest of your breast tissue. Follow-up care is a key part of your treatment and safety. Be sure to make and go to all appointments, and call your doctor if you are having problems. It's also a good idea to know your test results and keep a list of the medicines you take. How can you care for yourself at home? · Make an appointment to have a mammogram and other follow-up visits as recommended by your doctor. When should you call for help? Watch closely for changes in your health, and be sure to contact your doctor if:    · You do not get better as expected.     · Your breast has changed.     · You have pain in your breast.     · You have a discharge from your nipple.     · A breast lump changes or does not go away. Where can you learn more? Go to http://mitch-guillermina.info/. Enter F199 in the search box to learn more about \"Breast Lumps: Care Instructions. \"  Current as of: May 14, 2018  Content Version: 11.9  © 5665-1141 [employer]. Care instructions adapted under license by [employer] (which disclaims liability or warranty for this information). If you have questions about a medical condition or this instruction, always ask your healthcare professional. [address] any warranty or liability for your use of this information.

## 2019-07-18 NOTE — PROGRESS NOTES
[facility]  [email or web address]    Morgan Miranda MD, FACOG       OB/GYN Problem visit    Chief Complaint:   Chief Complaint   Patient presents with   [address] [facility] Breast Mass     felt 1 week ago  in left breast denies pain, discoloration, or nipple drainage       History of Present Illness: This is a new problem being evaluated by this provider. The patient is a 60 y.o. No obstetric history on file. female who reports having breast mass for 1 week. She reports the symptoms are is unchanged. Aggravating factors include none. Alleviating factors include none. She reports no family history of breast cancer. She is not breast feeding. She does not have other concerns. Last Mammogram Results:  Results from Appointment encounter on 08/16/18   [facility] 3D TARA W MAMMO BI SCREENING INCL CAD    Narrative STUDY: Bilateral digital screening mammogram with 3-D tomosynthesis    INDICATION:  Screening. COMPARISON:  Prior studies dating back to 2012    BREAST COMPOSITION:  There are scattered areas of fibroglandular density. FINDINGS: Bilateral digital screening mammography was performed and is  interpreted in conjunction with a computer assisted detection (CAD) system. Additionally, tomosynthesis of both breasts in the CC and MLO projections was  performed. No suspicious masses or calcifications are identified. There has been  no significant change. Bilateral biopsy clips are noted. Impression IMPRESSION:  BI-RADS 1: Negative. No mammographic evidence of malignancy. RECOMMENDATIONS:  Next screening mammogram is recommended in one year. The patient will be notified of these results. LMP: No LMP recorded.  Patient is postmenopausal.    PFSH:  Past Medical History:   Diagnosis Date    Acid reflux     Depression     bi-polar    Diabetes ([address]     High cholesterol     Hypertension     IBS (irritable bowel syndrome)     Sleep apnea      Past Surgical History: Procedure Laterality Date    HX  SECTION      c section x2     Family History   Adopted: Yes   Problem Relation Age of Onset    Psychiatric Disorder Mother     Cancer Mother         brain    Cancer Father         prostate    Heart Disease Father     Cancer Brother         neck     Social History     Tobacco Use    Smoking status: Never Smoker    Smokeless tobacco: Never Used   Substance Use Topics    Alcohol use: No    Drug use: No     No Known Allergies  Current Outpatient Medications   Medication Sig    VELTASSA 8.4 gram powder     naproxen (NAPROSYN) 500 mg tablet     buPROPion XL (WELLBUTRIN XL) 300 mg XL tablet     fenofibrate (LOFIBRA) 160 mg tablet     hydroCHLOROthiazide (HYDRODIURIL) 25 mg tablet     ferrous sulfate 325 mg (65 mg iron) tablet     DOC-Q-LACE 100 mg capsule     aspirin 81 mg chewable tablet Take 81 mg by mouth daily.  rosuvastatin (CRESTOR) 20 mg tablet     loratadine (CLARITIN) 10 mg tablet     pantoprazole (PROTONIX) 40 mg tablet     lisinopril (PRINIVIL, ZESTRIL) 20 mg tablet Take  by mouth daily.  DULCOLAX, BISACODYL, PO Take  by mouth.  citalopram (CELEXA) 40 mg tablet Take 40 mg by mouth daily.  lamotrigine (LAMICTAL) 200 mg tablet Take  by mouth daily.  divalproex ER (DEPAKOTE ER) 500 mg ER tablet Take  by mouth.  glyBURIDE-metFORMIN (GLUCOVANCE) 5-500 mg per tablet Take 1 tablet by mouth Daily (before breakfast).  dicyclomine (BENTYL) 10 mg capsule Take 10 mg by mouth 4 times daily (before meals and nightly). No current facility-administered medications for this visit.         Review of Systems:  History obtained from the patient  Constitutional: negative for fevers, chills and weight loss  ENT ROS: negative for - hearing change, oral lesions or visual changes  Respiratory: negative for cough, wheezing or dyspnea on exertion  Cardiovascular: negative for chest pain, irregular heart beats, exertional chest pressure/discomfort  Gastrointestinal: negative for dysphagia, nausea and vomiting  Genito-Urinary ROS: no dysuria, trouble voiding, or hematuria  Inteument/breast: see HPI  Musculoskeletal:negative for stiff joints, neck pain and muscle weakness  Endocrine ROS: negative for - breast changes, galactorrhea or temperature intolerance  Hematological and Lymphatic ROS: negative for - blood clots, bruising or swollen lymph nodes    Physical Exam:  Visit Vitals  /84 (BP 1 Location: Left arm, BP Patient Position: Sitting)   Ht 5' 4\" (1.626 m)   Wt 215 lb 9.6 oz (97.8 kg)   BMI 37.01 kg/m²       GENERAL: alert, well appearing, and in no distress  HEAD: normocephalic, atraumatic. NECK:  supple, no significant adenopathy, no palpable masses  PULM: clear to auscultation, no wheezes, rales or rhonchi, symmetric air entry   COR: normal rate and regular rhythm, S1 and S2 normal   BACK: no cvat  ABDOMEN: soft, nontender, nondistended, no masses or organomegaly   BREAST:       Left breast appear normal, + smooth mass see image, no skin or nipple changes or axillary nodes. Right breast appear normal, no suspicious masses, no skin or nipple changes or axillary nodes  NEURO: alert, oriented, normal speech  SKIN: no breast skin changes    Assessment:  Encounter Diagnoses   Name Primary?  Left breast lump Yes       Plan:  The patient is advised that she should contact the office if she does not note improvement or if symptoms recur. She should contact our office with any questions or concerns. She could keep her routine annual exam appointment. We reviewed self breast exams with the patient. We recommend follow up with PCP for non-gynecologic complaints and chronic medical problems. Breast referral, disc benign findings: smooth/mobile but rec evaluation with MMG due next month and h/o prior breast bx, and FH not fully known.      Orders Placed This Encounter    REFERRAL TO BREAST SURGERY

## 2019-07-25 ENCOUNTER — OFFICE VISIT (OUTPATIENT)
Dept: SURGERY | Age: 59
End: 2019-07-25

## 2019-07-25 VITALS
WEIGHT: 215 LBS | BODY MASS INDEX: 36.7 KG/M2 | DIASTOLIC BLOOD PRESSURE: 70 MMHG | SYSTOLIC BLOOD PRESSURE: 125 MMHG | HEART RATE: 83 BPM | HEIGHT: 64 IN

## 2019-07-25 DIAGNOSIS — N63.20 BREAST MASS, LEFT: Primary | ICD-10-CM

## 2019-07-25 NOTE — PROGRESS NOTES
HISTORY OF PRESENT ILLNESS  Vee Blanton is a 60 y.o. female. HPI ESTABLISHED patient here for complaints of a LEFT palpable breast lump. The patient found it about 2 weeks ago. She saw Dr. Judd Munguia for her annual and she could also palpate it the lump. The patient denies any nipple inversion or discharge. The lump is not painful to palpation. 2017 seen for a RIGHT breast mass. No discrete mass on ultrasound. The patient has stage 4 CRF. Not yet on dialysis. [facility] Results (most recent):  Results from Appointment encounter on 08/16/18   [facility] 3D TARA W MAMMO BI SCREENING INCL CAD    Narrative STUDY: Bilateral digital screening mammogram with 3-D tomosynthesis    INDICATION:  Screening. COMPARISON:  Prior studies dating back to 2012    BREAST COMPOSITION:  There are scattered areas of fibroglandular density. FINDINGS: Bilateral digital screening mammography was performed and is  interpreted in conjunction with a computer assisted detection (CAD) system. Additionally, tomosynthesis of both breasts in the CC and MLO projections was  performed. No suspicious masses or calcifications are identified. There has been  no significant change. Bilateral biopsy clips are noted. Impression IMPRESSION:  BI-RADS 1: Negative. No mammographic evidence of malignancy. RECOMMENDATIONS:  Next screening mammogram is recommended in one year. The patient will be notified of these results. ROS    Physical Exam   Pulmonary/Chest: Right breast exhibits no mass, no nipple discharge, no skin change and no tenderness. Left breast exhibits mass (nodular tissue medial breast.  I do not feel a discrete mass). Left breast exhibits no nipple discharge, no skin change and no tenderness. Breasts are symmetrical.         BREAST ULTRASOUND  Indication: LEFT breast mass medial breast  Technique:   The LEFT breast and axilla were scanned using a high-frequency linear-array near-field transducer  Findings: fatty lobular tissue in the medial breast.  No abnormal mass, lesion, or shadowing noted. No cysts. No axillary lymphadenopathy  Impression: Normal breast tissue   Disposition: No worrisome finding on ultrasound  ASSESSMENT and PLAN    ICD-10-CM ICD-9-CM    1. Breast mass, left N63.20 611.72      No discrete mass palpated  No worrisome finding on ultrasound. Pt has very clear mammograms.    She is due for a mammogram next month which she will have at  [facility] office

## 2019-07-25 NOTE — LETTER
7/25/19 Patient: Omar Cai YOB: 1960 Date of Visit: 7/25/2019 Patricia Bunch MD 
[address] [place] [id number] VIA In Basket Dear Patricia Bunch MD, Thank you for referring Ms. Vee Blanton to [address] for evaluation. My notes for this consultation are attached. If you have questions, please do not hesitate to call me. I look forward to following your patient along with you.  
 
 
Sincerely, 
 
Rosa Cooper MD

## 2019-07-25 NOTE — PATIENT INSTRUCTIONS
Breast Lumps: Care Instructions  Your Care Instructions  Breast lumps are common, especially in women between ages 27 and 48. Many women's breasts feel lumpy and tender before their menstrual period. Women also may have lumps when they are breastfeeding. Breast lumps may go away after menopause. All new breast lumps in women after menopause should be checked by a doctor. Although lumps may be normal for you, it is important to have your doctor check any lump or thickness that is not like the rest of your breast to make sure it is not cancer. A lump may be larger, harder, or different from the rest of your breast tissue. Follow-up care is a key part of your treatment and safety. Be sure to make and go to all appointments, and call your doctor if you are having problems. It's also a good idea to know your test results and keep a list of the medicines you take. How can you care for yourself at home? · Make an appointment to have a mammogram and other follow-up visits as recommended by your doctor. When should you call for help? Watch closely for changes in your health, and be sure to contact your doctor if:    · You do not get better as expected.     · Your breast has changed.     · You have pain in your breast.     · You have a discharge from your nipple.     · A breast lump changes or does not go away. Where can you learn more? Go to http://mitch-guillermina.info/. Enter T330 in the search box to learn more about \"Breast Lumps: Care Instructions. \"  Current as of: February 19, 2019  Content Version: 12.1  © 1961-5274 [employer]. Care instructions adapted under license by [employer] (which disclaims liability or warranty for this information). If you have questions about a medical condition or this instruction, always ask your healthcare professional. [address] any warranty or liability for your use of this information.

## 2019-10-11 ENCOUNTER — OFFICE VISIT (OUTPATIENT)
Dept: OBGYN CLINIC | Age: 59
End: 2019-10-11

## 2019-10-11 VITALS — SYSTOLIC BLOOD PRESSURE: 118 MMHG | WEIGHT: 223 LBS | BODY MASS INDEX: 38.28 KG/M2 | DIASTOLIC BLOOD PRESSURE: 68 MMHG

## 2019-10-11 DIAGNOSIS — Z01.419 WELL WOMAN EXAM WITH ROUTINE GYNECOLOGICAL EXAM: Primary | ICD-10-CM

## 2019-10-11 NOTE — PROGRESS NOTES
Annual exam ages 45-62      Vee M Day is a No obstetric history on file. ,  60 y.o. female   No LMP recorded. Patient is postmenopausal.    She presents for her annual checkup. She is having no significant problems. With regard to the Gardasil vaccine, she is older than the FDA approved age to receive it. Menstrual status:    Her periods are nonexistent in flow. Contraception:    The current method of family planning is NA post menopause. Hormonal status:  She reports no perimenstrual type symptoms. She is not having vasomotor symptoms. The patient is not using any ERT. Sexual history:    She  has no sexual activity history on file. Medical conditions:    Since her last annual GYN exam about one year ago, she has not the following changes in her health history: fractured 4 ribs when she fell     Surgical history confirmed with patient. has a past surgical history that includes hx  section. Pap and Mammogram History:    Her most recent Pap smear was normal, obtained 2 year(s) ago. The patient had her mammogram today in our office. Breast Cancer History/Substance Abuse: negative      Osteoporosis History:    Family history does not include a first or second degree relative with osteopenia or osteoporosis. A bone density scan has never been done.     Past Medical History:   Diagnosis Date    Acid reflux     Depression     bi-polar    Diabetes (HCC)     High cholesterol     Hypertension     IBS (irritable bowel syndrome)     Sleep apnea      Past Surgical History:   Procedure Laterality Date    HX  SECTION      c section x2       Current Outpatient Medications   Medication Sig Dispense Refill    VELTASSA 8.4 gram powder       naproxen (NAPROSYN) 500 mg tablet       buPROPion XL (WELLBUTRIN XL) 300 mg XL tablet       fenofibrate (LOFIBRA) 160 mg tablet       hydroCHLOROthiazide (HYDRODIURIL) 25 mg tablet       ferrous sulfate 325 mg (65 mg iron) tablet       DOC-Q-LACE 100 mg capsule       aspirin 81 mg chewable tablet Take 81 mg by mouth daily.  rosuvastatin (CRESTOR) 20 mg tablet       loratadine (CLARITIN) 10 mg tablet       pantoprazole (PROTONIX) 40 mg tablet       lisinopril (PRINIVIL, ZESTRIL) 20 mg tablet Take  by mouth daily.  DULCOLAX, BISACODYL, PO Take  by mouth.  citalopram (CELEXA) 40 mg tablet Take 40 mg by mouth daily.  lamotrigine (LAMICTAL) 200 mg tablet Take  by mouth daily.  divalproex ER (DEPAKOTE ER) 500 mg ER tablet Take  by mouth.  glyBURIDE-metFORMIN (GLUCOVANCE) 5-500 mg per tablet Take 1 tablet by mouth Daily (before breakfast).  dicyclomine (BENTYL) 10 mg capsule Take 10 mg by mouth 4 times daily (before meals and nightly). Allergies: Patient has no known allergies. Tobacco History:  reports that she has never smoked. She has never used smokeless tobacco.  Alcohol Abuse:  reports that she does not drink alcohol. Drug Abuse:  reports that she does not use drugs.     Family Medical/Cancer History:   Family History   Adopted: Yes   Problem Relation Age of Onset    Psychiatric Disorder Mother     Cancer Mother         brain    Cancer Father         prostate    Heart Disease Father     Cancer Brother         neck        Review of Systems - History obtained from the patient  Constitutional: negative for weight loss, fever, night sweats  HEENT: negative for hearing loss, earache, congestion, snoring, sorethroat  CV: negative for chest pain, palpitations, edema  Resp: negative for cough, shortness of breath, wheezing  GI: negative for change in bowel habits, abdominal pain, black or bloody stools  : negative for frequency, dysuria, hematuria, vaginal discharge  MSK: negative for back pain, joint pain, muscle pain  Breast: negative for breast lumps, nipple discharge, galactorrhea  Skin :negative for itching, rash, hives  Neuro: negative for dizziness, headache, confusion, weakness  Psych: negative for anxiety, depression, change in mood  Heme/lymph: negative for bleeding, bruising, pallor    Physical Exam    Visit Vitals  /68   Wt 223 lb (101.2 kg)   BMI 38.28 kg/m²       Constitutional  · Appearance: well-nourished, well developed, alert, in no acute distress    HENT  · Head and Face: appears normal    Neck  · Inspection/Palpation: normal appearance, no masses or tenderness  · Lymph Nodes: no lymphadenopathy present  · Thyroid: gland size normal, nontender, no nodules or masses present on palpation    Chest  · Respiratory Effort: breathing unlabored  · Auscultation:     Cardiovascular  · Heart:  · Auscultation:     Breasts  · Inspection of Breasts: breasts symmetrical, no skin changes, no discharge present, nipple appearance normal, no skin retraction present  · Palpation of Breasts and Axillae: no masses present on palpation, no breast tenderness  · Axillary Lymph Nodes: no lymphadenopathy present    Gastrointestinal  · Abdominal Examination: abdomen non-tender to palpation, normal bowel sounds, no masses present  · Liver and spleen: no hepatomegaly present, spleen not palpable  · Hernias: no hernias identified    Genitourinary  · External Genitalia: normal appearance for age, no discharge present, no tenderness present, no inflammatory lesions present, no masses present, no atrophy present  · Vagina: normal vaginal vault without central or paravaginal defects, no discharge present, no inflammatory lesions present, no masses present  · Bladder: non-tender to palpation  · Urethra: appears normal  · Cervix: normal   · Uterus: normal size, shape and consistency  · Adnexa: no adnexal tenderness present, no adnexal masses present  · Perineum: perineum within normal limits, no evidence of trauma, no rashes or skin lesions present  · Anus: anus within normal limits, no hemorrhoids present  · Inguinal Lymph Nodes: no lymphadenopathy present    Skin  · General Inspection: no rash, no lesions identified    Neurologic/Psychiatric  · Mental Status:  · Orientation: grossly oriented to person, place and time  · Mood and Affect: mood normal, affect appropriate    Assessment:  Routine gynecologic examination  Her current medical status is satisfactory with no evidence of significant gynecologic issues.     Plan:  Counseled re: diet, exercise, healthy lifestyle  Return for yearly wellness visits  Rec annual mammogram

## 2020-10-13 ENCOUNTER — OFFICE VISIT (OUTPATIENT)
Dept: OBGYN CLINIC | Age: 60
End: 2020-10-13
Payer: MEDICARE

## 2020-10-13 VITALS
DIASTOLIC BLOOD PRESSURE: 80 MMHG | HEIGHT: 64 IN | WEIGHT: 226 LBS | SYSTOLIC BLOOD PRESSURE: 148 MMHG | BODY MASS INDEX: 38.58 KG/M2

## 2020-10-13 DIAGNOSIS — Z01.419 WELL WOMAN EXAM WITH ROUTINE GYNECOLOGICAL EXAM: ICD-10-CM

## 2020-10-13 DIAGNOSIS — Z01.419 WELL WOMAN EXAM WITH ROUTINE GYNECOLOGICAL EXAM: Primary | ICD-10-CM

## 2020-10-13 PROCEDURE — G0101 CA SCREEN;PELVIC/BREAST EXAM: HCPCS | Performed by: OBSTETRICS & GYNECOLOGY

## 2020-10-13 PROCEDURE — G9899 SCRN MAM PERF RSLTS DOC: HCPCS | Performed by: OBSTETRICS & GYNECOLOGY

## 2020-10-13 PROCEDURE — G8432 DEP SCR NOT DOC, RNG: HCPCS | Performed by: OBSTETRICS & GYNECOLOGY

## 2020-10-13 PROCEDURE — 3017F COLORECTAL CA SCREEN DOC REV: CPT | Performed by: OBSTETRICS & GYNECOLOGY

## 2020-10-13 PROCEDURE — G8419 CALC BMI OUT NRM PARAM NOF/U: HCPCS | Performed by: OBSTETRICS & GYNECOLOGY

## 2020-10-13 RX ORDER — SPIRONOLACTONE 25 MG/1
TABLET ORAL
COMMUNITY
Start: 2020-09-22

## 2020-10-13 RX ORDER — FLUOXETINE HYDROCHLORIDE 40 MG/1
CAPSULE ORAL
COMMUNITY
Start: 2020-10-12

## 2020-10-13 NOTE — PROGRESS NOTES
[de-identified] M Day is a No obstetric history on file. ,  60 y.o. female [facility] whose LMP was on  who presents for her annual checkup. She is having no significant problems. Menstrual status:    She is not having periods because she is menopausal.    The patient is not using HRT. Contraception:    She does not need contraception because she is menopausal.    Sexual history:    She  reports previously being sexually active. She reports using the following method of birth control/protection: None. Medical conditions:    Since her last annual GYN exam about one year ago, she has had the following changes in her health history: Is going to have a renal transplant later this year. .       Pap and Mammogram History:    Her most recent Pap smear was normal obtained 3 year(s) ago. The patient had her mammogram today in our office. Breast Cancer History/Substance Abuse:    She has no family history of breast cancer. Osteoporosis History:    Family history does not include a first or second degree relative with osteopenia or osteoporosis. A bone density scan has not been previously obtained.      Past Medical History:   Diagnosis Date    Acid reflux     Depression     bi-polar    Diabetes (HCC)     High cholesterol     Hypertension     IBS (irritable bowel syndrome)     Kidney disease, chronic, stage IV (severe, EGFR 15-29 ml/min) ([facility])     Sleep apnea      Past Surgical History:   Procedure Laterality Date    HX  SECTION      c section x2     Current Outpatient Medications   Medication Sig Dispense Refill    FLUoxetine (PROzac) 40 mg capsule       spironolactone (ALDACTONE) 25 mg tablet       VELTASSA 8.4 gram powder       buPROPion XL (WELLBUTRIN XL) 300 mg XL tablet       fenofibrate (LOFIBRA) 160 mg tablet       hydroCHLOROthiazide (HYDRODIURIL) 25 mg tablet       ferrous sulfate 325 mg (65 mg iron) tablet       DOC-Q-LACE 100 mg capsule       aspirin 81 mg chewable tablet Take 81 mg by mouth daily.  rosuvastatin (CRESTOR) 20 mg tablet       loratadine (CLARITIN) 10 mg tablet       pantoprazole (PROTONIX) 40 mg tablet       lisinopril (PRINIVIL, ZESTRIL) 20 mg tablet Take  by mouth daily.  lamotrigine (LAMICTAL) 200 mg tablet Take  by mouth daily.  divalproex ER (DEPAKOTE ER) 500 mg ER tablet Take  by mouth.  dicyclomine (BENTYL) 10 mg capsule Take 10 mg by mouth 4 times daily (before meals and nightly). Allergies: Patient has no known allergies. Social History     Socioeconomic History    Marital status:      Spouse name: Not on file    Number of children: Not on file    Years of education: Not on file    Highest education level: Not on file   Occupational History    Not on file   Social Needs    Financial resource strain: Not on file    Food insecurity     Worry: Not on file     Inability: Not on file    Transportation needs     Medical: Not on file     Non-medical: Not on file   Tobacco Use    Smoking status: Never Smoker    Smokeless tobacco: Never Used   Substance and Sexual Activity    Alcohol use: No    Drug use: No    Sexual activity: Not Currently     Birth control/protection: None   Lifestyle    Physical activity     Days per week: Not on file     Minutes per session: Not on file    Stress: Not on file   Relationships    Social connections     Talks on phone: Not on file     Gets together: Not on file     Attends Zoroastrian service: Not on file     Active member of club or organization: Not on file     Attends meetings of clubs or organizations: Not on file     Relationship status: Not on file    Intimate partner violence     Fear of current or ex partner: Not on file     Emotionally abused: Not on file     Physically abused: Not on file     Forced sexual activity: Not on file   Other Topics Concern    Not on file   Social History Narrative    Not on file     Tobacco History:  reports that she has never smoked. She has never used smokeless tobacco.  Alcohol Abuse:  reports no history of alcohol use. Drug Abuse:  reports no history of drug use.   Patient Active Problem List   Diagnosis Code    Costochondritis M94.0    Ob[facility] morbid [facility] E66.01         Review of Systems - History obtained from the patient  Constitutional: negative for weight loss, fever, night sweats  HEENT: negative for hearing loss, earache, congestion, snoring, sorethroat  CV: negative for chest pain, palpitations, edema  Resp: negative for cough, shortness of breath, wheezing  GI: negative for change in bowel habits, abdominal pain, black or bloody stools  : negative for frequency, dysuria, hematuria, vaginal discharge  MSK: negative for back pain, joint pain, muscle pain  Breast: negative for breast lumps, nipple discharge, galactorrhea  Skin :negative for itching, rash, hives  Neuro: negative for dizziness, headache, confusion, weakness  Psych: negative for anxiety, depression, change in mood  Heme/lymph: negative for bleeding, bruising, pallor    Physical Exam    Visit Vitals  BP (!) 148/80   Ht 5' 4\" (1.626 m)   Wt 226 lb (102.5 kg)   BMI 38.79 kg/m²     Constitutional  · Appearance: well-nourished, well developed, alert, in no acute distress    HENT  · Head and Face: appears normal    Neck  · Inspection/Palpation: normal appearance, no masses or tenderness  · Lymph Nodes: no lymphadenopathy present    Chest  · Respiratory Effort: breathing normal    Breasts  · Inspection of Breasts: breasts symmetrical, no skin changes, no discharge present, nipple appearance normal, no skin retraction present  · Palpation of Breasts and Axillae: no masses present on palpation, no breast tenderness  · Axillary Lymph Nodes: no lymphadenopathy present    Gastrointestinal  · Abdominal Examination: abdomen non-tender to palpation, normal bowel sounds, no masses present  · Liver and spleen: no hepatomegaly present, spleen not palpable  · Hernias: no hernias identified    Skin  · General Inspection: no rash, no lesions identified    Neurologic/Psychiatric  · Mental Status:  · Orientation: grossly oriented to person, place and time  · Mood and Affect: mood normal, affect appropriate    Genitourinary  · External Genitalia: normal appearance for age, no discharge present, no tenderness present, no inflammatory lesions present, no masses present, no atrophy present  · Vagina: normal vaginal vault without central or paravaginal defects, no discharge present, no inflammatory lesions present, no masses present  · Bladder: non-tender to palpation  · Urethra: appears normal  · Cervix: normal   · Uterus: normal size, shape and consistency  · Adnexa: no adnexal tenderness present, no adnexal masses present  · Perineum: perineum within normal limits, no evidence of trauma, no rashes or skin lesions present  · Anus: anus within normal limits, no hemorrhoids present  · Inguinal Lymph Nodes: no lymphadenopathy present    Assessment:  Routine gynecologic examination  Her current medical status is satisfactory with no evidence of significant gynecologic issues.     Plan:  Counseled re: diet, exercise, healthy lifestyle  Return for yearly wellness visits  Rec annual mammogram  Patient Verbalized understanding

## 2020-10-18 LAB
CYTOLOGIST CVX/VAG CYTO: NORMAL
CYTOLOGY CVX/VAG DOC CYTO: NORMAL
CYTOLOGY CVX/VAG DOC THIN PREP: NORMAL
CYTOLOGY HISTORY:: NORMAL
DX ICD CODE: NORMAL
LABCORP, 190119: NORMAL
Lab: NORMAL
OTHER STN SPEC: NORMAL
STAT OF ADQ CVX/VAG CYTO-IMP: NORMAL

## 2022-03-19 PROBLEM — E66.01 OBESITY, MORBID (HCC): Status: ACTIVE | Noted: 2017-11-27

## 2023-05-18 RX ORDER — FLUOXETINE HYDROCHLORIDE 40 MG/1
CAPSULE ORAL
COMMUNITY
Start: 2020-10-12

## 2023-05-18 RX ORDER — LORATADINE 10 MG/1
TABLET ORAL
COMMUNITY
Start: 2017-06-12

## 2023-05-18 RX ORDER — SPIRONOLACTONE 25 MG/1
TABLET ORAL
COMMUNITY
Start: 2020-09-22

## 2023-05-18 RX ORDER — FENOFIBRATE 160 MG/1
TABLET ORAL
COMMUNITY
Start: 2017-11-24

## 2023-05-18 RX ORDER — PANTOPRAZOLE SODIUM 40 MG/1
TABLET, DELAYED RELEASE ORAL
COMMUNITY
Start: 2017-06-08

## 2023-05-18 RX ORDER — LAMOTRIGINE 200 MG/1
TABLET ORAL DAILY
COMMUNITY

## 2023-05-18 RX ORDER — PATIROMER 8.4 G/1
POWDER, FOR SUSPENSION ORAL
COMMUNITY
Start: 2019-07-15

## 2023-05-18 RX ORDER — DIVALPROEX SODIUM 500 MG/1
TABLET, EXTENDED RELEASE ORAL
COMMUNITY

## 2023-05-18 RX ORDER — BUPROPION HYDROCHLORIDE 300 MG/1
TABLET ORAL
COMMUNITY
Start: 2017-11-21

## 2023-05-18 RX ORDER — DICYCLOMINE HYDROCHLORIDE 10 MG/1
10 CAPSULE ORAL
COMMUNITY

## 2023-05-18 RX ORDER — FERROUS SULFATE 325(65) MG
TABLET ORAL
COMMUNITY
Start: 2017-11-01

## 2023-05-18 RX ORDER — ROSUVASTATIN CALCIUM 20 MG/1
TABLET, COATED ORAL
COMMUNITY
Start: 2017-06-25

## 2023-05-18 RX ORDER — HYDROCHLOROTHIAZIDE 25 MG/1
TABLET ORAL
COMMUNITY
Start: 2017-11-07

## 2023-05-18 RX ORDER — LISINOPRIL 20 MG/1
TABLET ORAL DAILY
COMMUNITY

## 2023-05-18 RX ORDER — PSEUDOEPHEDRINE HCL 30 MG
TABLET ORAL
COMMUNITY
Start: 2017-11-01

## 2023-05-18 RX ORDER — ASPIRIN 81 MG/1
81 TABLET, CHEWABLE ORAL DAILY
COMMUNITY